# Patient Record
(demographics unavailable — no encounter records)

---

## 2025-07-16 NOTE — RESULTS/DATA
[FreeTextEntry1] : EXAM:  MRI CERVICAL SPINE WITHOUT CONTRAST HISTORY:  Neck pain, radiculopathy TECHNIQUE:  Multiplanar, multi-sequential MRI of the cervical spine was obtained on a 1.5T scanner using a standard protocol. COMPARISON:  None FINDINGS:  There is straightening of the cervical lordosis which may be related to muscle spasm. At C2-C3, C3-C4 and C4-C5, the intervertebral discs appear maintained. At C5-C6 there is a broad-based posterior disc herniation to the right of midline deforming the thecal sac and mildly impinging the spinal cord. The neural foramina appear free of significant compromise at this level. Mild reactive Modic type I discogenic endplate changes are seen at this level. At C6-C7, there is a shallow midline posterior disc herniation deforming the thecal sac. The neural foramina appear free of significant compromise at this level. At C7-T1, the intervertebral disc appears maintained. Intradurally, the spinal cord appears intrinsically within normal limits, without any intramedullary signal abnormalities noted. No intradural, extra medullary abnormalities of the visualized spine are noted. The craniocervical junction is unremarkable. No fractures or destructive osseous lesions are demonstrated. No paravertebral soft tissue masses are noted. IMPRESSION: 1. At C5-C6 there is a posterior disc herniation to the right of midline mildly impinging the spinal cord, associated with reactive Modic type I discogenic endplate changes 2. At C6-C7 there is a shallow midline posterior disc herniation deforming the thecal sac 3. Straightening of the cervical lordosis which may be related to muscle spasm  	 EXAM:  MRI LUMBAR SPINE WITHOUT CONTRAST HISTORY: Lumbar spine pain. TECHNIQUE: Multiplanar, multi-sequential MRI of the lumbar spine was obtained on a 1.5T scanner using a standard protocol. COMPARISON:  None FINDINGS: There is straightening of the lordotic curvature in the upper lumbar spine which may be related to muscle spasm. At L1-L2, the intervertebral disc appears maintained. At L2-L3 there is a focal left-sided posterior disc herniation deforming the thecal sac and impinging the left L3 nerve root within the lateral recess. The L2 neural foramina appear free of significant compromise. At L3-L4 the intervertebral disc appears maintained and the canal and neural foramina appear free of compromise. Mild left L3-L4 facet arthrosis is noted. At L4-L5, the intervertebral disc appears maintained and the canal and neural foramina appear free of significant compromise. Bilateral L4-L5 facet arthrosis is noted, left more than right. At L5-S1, there is a broad-based midline posterior disc herniation effacing the ventral extradural fat and mildly impinging the S1 nerve roots within the lateral recesses. The L5 neural foramina appear free of significant compromise. Slight retrolisthesis of L5 on S1 is noted. Modic type I discogenic endplate changes are noted at this level, most prominent involving the inferior L5 endplate. Intradurally, the conus and cauda equina appear intrinsically within normal limits and there are no intradural abnormalities noted. No fractures or destructive osseous lesions are demonstrated. No paravertebral soft tissue masses are noted. IMPRESSION: 1. At L5-S1 there is a broad-based midline posterior disc herniation impinging the S1 nerve roots within the lateral recesses associated with mild retrolisthesis of L5 on S1 and Modic type I endplate changes 2. At L2-L3 there is a focal left-sided posterior disc herniation impinging the left L3 nerve root within the lateral recess 3. Facet arthrosis noted at L4-L5, left more than right, and at L3-L4 on the left 4. Straightening of the upper lumbar lordosis which may be related to muscle spasm